# Patient Record
Sex: MALE | Race: WHITE | URBAN - METROPOLITAN AREA
[De-identification: names, ages, dates, MRNs, and addresses within clinical notes are randomized per-mention and may not be internally consistent; named-entity substitution may affect disease eponyms.]

---

## 2021-07-17 ENCOUNTER — LAB ENCOUNTER (OUTPATIENT)
Dept: LAB | Facility: HOSPITAL | Age: 51
End: 2021-07-17
Attending: STUDENT IN AN ORGANIZED HEALTH CARE EDUCATION/TRAINING PROGRAM
Payer: COMMERCIAL

## 2021-07-17 DIAGNOSIS — Z01.818 PRE-OP TESTING: ICD-10-CM

## 2021-07-17 LAB — SARS-COV-2 RNA RESP QL NAA+PROBE: NOT DETECTED

## 2021-07-19 PROBLEM — Z86.010 HISTORY OF ADENOMATOUS POLYP OF COLON: Status: ACTIVE | Noted: 2021-07-19

## 2021-07-19 PROBLEM — R13.10 DYSPHAGIA: Status: ACTIVE | Noted: 2021-07-19

## 2021-07-19 PROBLEM — K64.4 EXTERNAL HEMORRHOIDS WITHOUT COMPLICATION: Status: ACTIVE | Noted: 2021-07-19

## 2021-07-19 PROBLEM — K31.7 GASTRIC POLYP: Status: ACTIVE | Noted: 2021-07-19

## 2021-10-18 ENCOUNTER — OFFICE VISIT (OUTPATIENT)
Dept: SURGERY | Facility: CLINIC | Age: 51
End: 2021-10-18
Payer: COMMERCIAL

## 2021-10-18 VITALS — TEMPERATURE: 97 F | WEIGHT: 200 LBS | HEIGHT: 71 IN | BODY MASS INDEX: 28 KG/M2

## 2021-10-18 DIAGNOSIS — K64.2 PROLAPSED INTERNAL HEMORRHOIDS, GRADE 3: ICD-10-CM

## 2021-10-18 DIAGNOSIS — K64.4 PROLAPSED EXTERNAL HEMORRHOIDS: Primary | ICD-10-CM

## 2021-10-18 PROCEDURE — 46600 DIAGNOSTIC ANOSCOPY SPX: CPT | Performed by: COLON & RECTAL SURGERY

## 2021-10-18 PROCEDURE — 99243 OFF/OP CNSLTJ NEW/EST LOW 30: CPT | Performed by: COLON & RECTAL SURGERY

## 2021-10-18 PROCEDURE — 3008F BODY MASS INDEX DOCD: CPT | Performed by: COLON & RECTAL SURGERY

## 2021-10-18 NOTE — PATIENT INSTRUCTIONS
The patient presents today in consultation for hemorrhoids. The patient dates that he has had hemorrhoids for many years, however they are causing him increasing issues. The patient states that he feels them on the inside and the outside.   He states th scheduling patient to undergo the excisional hemorrhoidectomy's, and rubber band ligation at the [facility] in [facility] [address].     All risks, benefits, complications and alternatives to the proposed procedure(s) were fully discussed with the p

## 2021-10-18 NOTE — PROGRESS NOTES
Follow Up Visit Note       Active Problems      1. Prolapsed external hemorrhoids    2. Prolapsed internal hemorrhoids, grade 3          Chief Complaint   Patient presents with:  Hemorrhoids: NP - hemorrhoids started years ago but keep coming back.  pt rate appropriate changes in documentation as necessary  I attest to the accuracy of this note as detailed above    Saad Euceda MD FACS FASCRS    My total time spent with this patient on today's visit was 30 minutes.   This includes time in preparation, [employer] Systems  The Review of Systems has been reviewed by me during today. Review of Systems   Constitutional: Negative for chills, diaphoresis, fatigue, fever and unexpected weight change.    HENT: Negative for hearing loss, nosebleeds, sore throat and trouble supraclavicular adenopathy. Abdominal:      General: Bowel sounds are normal. There is no distension. Palpations: Abdomen is soft. Abdomen is not rigid. There is no fluid wave, hepatomegaly, splenomegaly, mass or pulsatile mass.       Tenderness: that they are itching on a daily basis. They will cause him some discomfort, his pain can reach a level of 2/10. He states that he feels they will sometimes be obstructing his defecation. He will see occasional blood on the toilet paper as well.     The patient. All questions from the patient were answered in detail. A description of the procedure(s) possible outcomes was fully discussed. The patient seemed to understand the conversation and its details.     Consent for the procedure(s) was confirmed wi

## 2021-10-18 NOTE — PROCEDURES
Procedure:  Anoscopy    Surgeon: Sophia Ching    Anesthesia: None    Findings: See the progress note attached for all findings    Operative Summary: The patient was placed in a prone position on the proctoscopy table, the hips were flexed in the jackknife p

## 2021-11-15 ENCOUNTER — LAB ENCOUNTER (OUTPATIENT)
Dept: LAB | Age: 51
End: 2021-11-15
Attending: COLON & RECTAL SURGERY
Payer: COMMERCIAL

## 2021-11-15 DIAGNOSIS — Z01.818 PRE-PROCEDURAL EXAMINATION: ICD-10-CM

## 2021-11-18 ENCOUNTER — LAB REQUISITION (OUTPATIENT)
Dept: LAB | Facility: HOSPITAL | Age: 51
End: 2021-11-18
Payer: COMMERCIAL

## 2021-11-18 DIAGNOSIS — K64.8 OTHER HEMORRHOIDS: ICD-10-CM

## 2021-11-18 PROCEDURE — 88304 TISSUE EXAM BY PATHOLOGIST: CPT | Performed by: COLON & RECTAL SURGERY

## 2021-11-18 PROCEDURE — 88305 TISSUE EXAM BY PATHOLOGIST: CPT | Performed by: COLON & RECTAL SURGERY

## 2021-12-13 ENCOUNTER — OFFICE VISIT (OUTPATIENT)
Dept: SURGERY | Facility: CLINIC | Age: 51
End: 2021-12-13

## 2021-12-13 VITALS
BODY MASS INDEX: 28 KG/M2 | DIASTOLIC BLOOD PRESSURE: 79 MMHG | HEIGHT: 71 IN | TEMPERATURE: 97 F | WEIGHT: 200 LBS | HEART RATE: 75 BPM | SYSTOLIC BLOOD PRESSURE: 130 MMHG

## 2021-12-13 DIAGNOSIS — K64.2 PROLAPSED INTERNAL HEMORRHOIDS, GRADE 3: Primary | ICD-10-CM

## 2021-12-13 DIAGNOSIS — K64.4 PROLAPSED EXTERNAL HEMORRHOIDS: ICD-10-CM

## 2021-12-13 PROCEDURE — 3078F DIAST BP <80 MM HG: CPT | Performed by: PHYSICIAN ASSISTANT

## 2021-12-13 PROCEDURE — 3008F BODY MASS INDEX DOCD: CPT | Performed by: PHYSICIAN ASSISTANT

## 2021-12-13 PROCEDURE — 3075F SYST BP GE 130 - 139MM HG: CPT | Performed by: PHYSICIAN ASSISTANT

## 2021-12-13 PROCEDURE — 99024 POSTOP FOLLOW-UP VISIT: CPT | Performed by: PHYSICIAN ASSISTANT

## 2021-12-13 NOTE — PROGRESS NOTES
Post Operative Visit Note       Active Problems  1. Prolapsed internal hemorrhoids, grade 3    2.  Prolapsed external hemorrhoids         Chief Complaint   Patient presents with:  Post-Op: PO 11/18 Hemorrhoidectomy- Pt. states little rectal bleeding and sor Substance and Sexual Activity      Alcohol use:  Yes        Alcohol/week: 4.0 standard drinks        Types: 1 Cans of beer, 1 Standard drinks or equivalent, 2 Glasses of wine per week      Drug use: Never       Current Outpatient Medications:   •  PEG 3350- hemorrhoidectomy site. This was removed at today's visit. On digital rectal exam the suture line is healing well. There is no evidence of any new hemorrhoids, fissures, fistula, or abscesses. Neurological:      Mental Status: He is alert.            Gracia Burt

## 2021-12-13 NOTE — PATIENT INSTRUCTIONS
The patient presents today for continued care evaluation after undergoing an excisional hemorrhoidectomy November 18, 2021. The patient states that he is doing well overall since having the operation.   He states he had some pain on the first day, tova

## (undated) NOTE — LETTER
10/18/21    Patient: Robina Butt  : 1970 Visit date: 10/18/2021    Dear  Sarah Germain MD    Thank you for referring Robina Butt to my practice. Please find my assessment and plan below.     Assessment   Prolapsed external hemorrhoids  ( hemorrhoids. He does have 1 external hemorrhoid that may have a mixed internal component that may need to be completely excised. He does have additional external hemorrhoidal tags. These will have to be excised as well.   We would rubber band the remaini